# Patient Record
Sex: MALE | Race: BLACK OR AFRICAN AMERICAN | NOT HISPANIC OR LATINO | Employment: UNEMPLOYED | URBAN - METROPOLITAN AREA
[De-identification: names, ages, dates, MRNs, and addresses within clinical notes are randomized per-mention and may not be internally consistent; named-entity substitution may affect disease eponyms.]

---

## 2023-04-13 ENCOUNTER — HOSPITAL ENCOUNTER (OUTPATIENT)
Facility: HOSPITAL | Age: 23
Discharge: HOME | End: 2023-04-13
Attending: EMERGENCY MEDICINE | Admitting: EMERGENCY MEDICINE
Payer: MEDICAID

## 2023-04-13 VITALS
SYSTOLIC BLOOD PRESSURE: 136 MMHG | WEIGHT: 180 LBS | DIASTOLIC BLOOD PRESSURE: 84 MMHG | HEART RATE: 87 BPM | BODY MASS INDEX: 28.93 KG/M2 | HEIGHT: 66 IN | RESPIRATION RATE: 16 BRPM | OXYGEN SATURATION: 99 % | TEMPERATURE: 98.9 F

## 2023-04-13 DIAGNOSIS — N34.2 URETHRITIS: Primary | ICD-10-CM

## 2023-04-13 LAB
BILIRUB UR QL STRIP: NEGATIVE
C TRACH RRNA CVX QL NAA+PROBE: DETECTED
CLARITY UR: CLEAR
COLOR UR: YELLOW
GLUCOSE UR STRIP-MCNC: NEGATIVE MG/DL
HGB UR QL STRIP.AUTO: ABNORMAL
KETONES UR QL STRIP: NEGATIVE
LEUKOCYTE ESTERASE UR QL STRIP.AUTO: NEGATIVE
N GONORRHOEA RRNA SPEC QL NAA+PROBE: NOT DETECTED
NITRITE UR QL STRIP: NEGATIVE
PH UR STRIP.AUTO: 5.5 [PH] (ref 5–8)
PROT UR QL STRIP: ABNORMAL
SP GR UR STRIP: >=1.03 (ref 1–1.03)
UROBILINOGEN UR QL STRIP: ABNORMAL

## 2023-04-13 PROCEDURE — 96372 THER/PROPH/DIAG INJ SC/IM: CPT

## 2023-04-13 PROCEDURE — G0463 HOSPITAL OUTPT CLINIC VISIT: HCPCS | Performed by: EMERGENCY MEDICINE

## 2023-04-13 PROCEDURE — 25010000002 CEFTRIAXONE PER 250 MG: Performed by: EMERGENCY MEDICINE

## 2023-04-13 PROCEDURE — 81003 URINALYSIS AUTO W/O SCOPE: CPT | Performed by: EMERGENCY MEDICINE

## 2023-04-13 PROCEDURE — 87491 CHLMYD TRACH DNA AMP PROBE: CPT | Performed by: EMERGENCY MEDICINE

## 2023-04-13 PROCEDURE — 87591 N.GONORRHOEAE DNA AMP PROB: CPT | Performed by: EMERGENCY MEDICINE

## 2023-04-13 RX ORDER — DOXYCYCLINE 100 MG/1
100 CAPSULE ORAL 2 TIMES DAILY
Qty: 20 CAPSULE | Refills: 0 | Status: SHIPPED | OUTPATIENT
Start: 2023-04-13 | End: 2023-04-23

## 2023-04-13 RX ORDER — DOXYCYCLINE 100 MG/1
100 TABLET ORAL ONCE
Status: COMPLETED | OUTPATIENT
Start: 2023-04-13 | End: 2023-04-13

## 2023-04-13 RX ADMIN — DOXYCYCLINE 100 MG: 100 TABLET ORAL at 21:03

## 2023-04-13 RX ADMIN — LIDOCAINE HYDROCHLORIDE 1000 MG: 10 INJECTION, SOLUTION EPIDURAL; INFILTRATION; INTRACAUDAL; PERINEURAL at 21:03

## 2023-04-13 NOTE — Clinical Note
[facility] [place] [address] [place] [address] [place] [address]  Phone: [phone]    Isa Sanders was seen and treated in our emergency department on 4/13/2023.  He may return to work on 04/15/2023.         Thank you for choosing [facility].    Teofilo Sena MD

## 2023-04-13 NOTE — ED NOTES
Pt present s to the ED with complaints of exposure to STD, painful urination, and discharge to the penis.

## 2023-04-14 NOTE — FSED PROVIDER NOTE
Subjective   History of Present Illness  Patient 22-year-old man who presents complaining of urethral discharge with dysuria.  Symptoms all began several days ago and gradually worsening.  He denies any testicular pain or back pain or abdominal pain or fevers or nausea or vomiting.  Patient states he is sexually active and has had unprotected sex.  Patient denies any penile lesions or redness or swelling.        Review of Systems    History reviewed. No pertinent past medical history.    No Known Allergies    History reviewed. No pertinent surgical history.    History reviewed. No pertinent family history.    Social History     Socioeconomic History   • Marital status: Single           Objective   Physical Exam  Vitals and nursing note reviewed.   Constitutional:       General: He is not in acute distress.     Appearance: Normal appearance. He is not toxic-appearing.   HENT:      Head: Normocephalic and atraumatic.      Nose: Nose normal.   Eyes:      Extraocular Movements: Extraocular movements intact.   Pulmonary:      Effort: Pulmonary effort is normal.   Musculoskeletal:         General: Normal range of motion.      Cervical back: Normal range of motion.   Skin:     General: Skin is warm and dry.      Capillary Refill: Capillary refill takes less than 2 seconds.   Neurological:      General: No focal deficit present.      Mental Status: He is alert.         Procedures           ED Course                                           MDM   Patient with several days of urethral discharge with discomfort with voiding.  Symptoms all began approximately 2 to 3 days ago gradually worsening.  Patient has no fevers or nausea or vomiting or abdominal pain or back pain or testicular pain or penile lesions or swelling.  The patient had a urinalysis which showed moderate blood.  A gonorrhea and Chlamydia urine test is pending and the patient will be treated for gonorrhea and chlamydia with IM Rocephin and doxycycline 100 mg  twice a day for 10 days.  Patient has been advised to follow-up on test results and to also notify sexual partner.  Patient will return if any concerns.    Final diagnoses:   Urethritis       ED Disposition  ED Disposition     ED Disposition   Discharge    Condition   Stable    Comment   --             PATIENT CONNECTION - [facility]  [facility] [address]  [phone]  Call in 1 week      [facility] [place] FS[facility]  [address] [facility] [address] [phone]    If symptoms worsen         Medication List      New Prescriptions    doxycycline 100 MG capsule  Commonly known as: MONODOX  Take 1 capsule by mouth 2 (Two) Times a Day for 10 days.           Where to Get Your Medications      These medications were sent to [facility]/pharmacy #[id number] - [place], [place] - [address] [place] [address] - [phone]  - [phone] FX  [address] [place] [address] [place] [address]    Hours: 24-hours Phone: [phone]   · doxycycline 100 MG capsule

## 2024-01-17 ENCOUNTER — HOSPITAL ENCOUNTER (EMERGENCY)
Facility: HOSPITAL | Age: 24
Discharge: HOME | End: 2024-01-17
Attending: EMERGENCY MEDICINE | Admitting: EMERGENCY MEDICINE
Payer: MEDICAID

## 2024-01-17 VITALS
HEART RATE: 120 BPM | OXYGEN SATURATION: 95 % | DIASTOLIC BLOOD PRESSURE: 83 MMHG | RESPIRATION RATE: 20 BRPM | TEMPERATURE: 99.2 F | SYSTOLIC BLOOD PRESSURE: 139 MMHG

## 2024-01-17 DIAGNOSIS — J10.1 INFLUENZA B: Primary | ICD-10-CM

## 2024-01-17 LAB
B PARAPERT DNA SPEC QL NAA+PROBE: NOT DETECTED
B PERT DNA SPEC QL NAA+PROBE: NOT DETECTED
C PNEUM DNA NPH QL NAA+NON-PROBE: NOT DETECTED
FLUAV SUBTYP SPEC NAA+PROBE: NOT DETECTED
FLUBV RNA ISLT QL NAA+PROBE: DETECTED
HADV DNA SPEC NAA+PROBE: NOT DETECTED
HCOV 229E RNA SPEC QL NAA+PROBE: NOT DETECTED
HCOV HKU1 RNA SPEC QL NAA+PROBE: NOT DETECTED
HCOV NL63 RNA SPEC QL NAA+PROBE: NOT DETECTED
HCOV OC43 RNA SPEC QL NAA+PROBE: NOT DETECTED
HMPV RNA NPH QL NAA+NON-PROBE: NOT DETECTED
HPIV1 RNA ISLT QL NAA+PROBE: NOT DETECTED
HPIV2 RNA SPEC QL NAA+PROBE: NOT DETECTED
HPIV3 RNA NPH QL NAA+PROBE: NOT DETECTED
HPIV4 P GENE NPH QL NAA+PROBE: NOT DETECTED
M PNEUMO IGG SER IA-ACNC: NOT DETECTED
RHINOVIRUS RNA SPEC NAA+PROBE: NOT DETECTED
RSV RNA NPH QL NAA+NON-PROBE: NOT DETECTED
S PYO AG THROAT QL: NEGATIVE
SARS-COV-2 RNA NPH QL NAA+NON-PROBE: NOT DETECTED

## 2024-01-17 PROCEDURE — 87880 STREP A ASSAY W/OPTIC: CPT | Performed by: PHYSICIAN ASSISTANT

## 2024-01-17 PROCEDURE — 87081 CULTURE SCREEN ONLY: CPT | Performed by: PHYSICIAN ASSISTANT

## 2024-01-17 PROCEDURE — 0202U NFCT DS 22 TRGT SARS-COV-2: CPT | Performed by: EMERGENCY MEDICINE

## 2024-01-17 PROCEDURE — 99283 EMERGENCY DEPT VISIT LOW MDM: CPT

## 2024-01-17 RX ORDER — OSELTAMIVIR PHOSPHATE 75 MG/1
75 CAPSULE ORAL ONCE
Status: COMPLETED | OUTPATIENT
Start: 2024-01-17 | End: 2024-01-17

## 2024-01-17 RX ORDER — OSELTAMIVIR PHOSPHATE 75 MG/1
75 CAPSULE ORAL 2 TIMES DAILY
Qty: 10 CAPSULE | Refills: 0 | Status: SHIPPED | OUTPATIENT
Start: 2024-01-17 | End: 2024-01-18 | Stop reason: SDUPTHER

## 2024-01-17 RX ORDER — ACETAMINOPHEN 500 MG
1000 TABLET ORAL ONCE
Status: COMPLETED | OUTPATIENT
Start: 2024-01-17 | End: 2024-01-17

## 2024-01-17 RX ORDER — DEXTROMETHORPHAN HYDROBROMIDE AND PROMETHAZINE HYDROCHLORIDE 15; 6.25 MG/5ML; MG/5ML
5 SYRUP ORAL 4 TIMES DAILY PRN
Qty: 180 ML | Refills: 0 | Status: SHIPPED | OUTPATIENT
Start: 2024-01-17 | End: 2024-01-18 | Stop reason: SDUPTHER

## 2024-01-17 RX ADMIN — ACETAMINOPHEN 1000 MG: 500 TABLET ORAL at 14:56

## 2024-01-17 RX ADMIN — OSELTAMIVIR PHOSPHATE 75 MG: 75 CAPSULE ORAL at 16:37

## 2024-01-17 NOTE — ED PROVIDER NOTES
EMERGENCY DEPARTMENT ENCOUNTER    Room Number:  T03/03  PCP: Provider, No Known  Discussed/ obtained information from independent historians: patient      HPI:  Chief Complaint: sore throat  A complete HPI/ROS/PMH/PSH/SH/FH are unobtainable due to: none  Context: Isa Sanders is a 23 y.o. male who presents to the ED c/o sore throat. Patient states this started around 2 days ago but has been experiencing congestion for several days.  Patient states he is experienced rhinorrhea and has also had a cough in which he has coughed up green mucus.  Patient says he has mild discomfort when swallowing and has been running a fever.  Patient states he has been feeling sleepy. He denies any vomiting, diarrhea, ear pain, or other systemic symptoms.      External (non-ED) record review:   Reviewed note from urgent care visit where patient seen for left flank pain and bright red blood per rectum.  Point-of-care urine dipstick checked.  Patient referred to emergency department for any worsening symptoms.  Reviewed prior laboratory studies.  Most recent point-of-care urine dipstick negative for blood.  Most recent chlamydia PCR positive.      PAST MEDICAL HISTORY  Active Ambulatory Problems     Diagnosis Date Noted    No Active Ambulatory Problems     Resolved Ambulatory Problems     Diagnosis Date Noted    No Resolved Ambulatory Problems     No Additional Past Medical History         PAST SURGICAL HISTORY  No past surgical history on file.      FAMILY HISTORY  No family history on file.      SOCIAL HISTORY  Social History     Socioeconomic History    Marital status: Single         ALLERGIES  Patient has no known allergies.        REVIEW OF SYSTEMS  Review of Systems   Constitutional:  Positive for fatigue and fever. Negative for chills.   HENT:  Positive for congestion, rhinorrhea and sore throat.    Respiratory:  Negative for cough and shortness of breath.    Cardiovascular:  Negative for chest pain and palpitations.    Gastrointestinal:  Negative for abdominal pain and vomiting.   Genitourinary:  Negative for dysuria and hematuria.   Musculoskeletal:  Negative for arthralgias and joint swelling.   Skin:  Negative for pallor and rash.   Neurological:  Negative for syncope and headaches.   Psychiatric/Behavioral:  Negative for confusion and hallucinations.          PHYSICAL EXAM  ED Triage Vitals   Temp Heart Rate Resp BP SpO2   01/17/24 1425 01/17/24 1425 01/17/24 1425 01/17/24 1445 01/17/24 1425   (!) 101.9 °F (38.8 °C) 120 20 139/83 95 %      Temp src Heart Rate Source Patient Position BP Location FiO2 (%)   01/17/24 1425 01/17/24 1425 -- -- --   Tympanic Monitor          Physical Exam  Constitutional:       General: He is not in acute distress.     Appearance: Normal appearance.   HENT:      Head: Normocephalic and atraumatic.      Nose: Nose normal. Congestion present.      Mouth/Throat:      Mouth: Mucous membranes are moist.   Eyes:      Conjunctiva/sclera: Conjunctivae normal.      Pupils: Pupils are equal, round, and reactive to light.   Cardiovascular:      Rate and Rhythm: Normal rate and regular rhythm.      Pulses: Normal pulses.      Heart sounds: Normal heart sounds.   Pulmonary:      Effort: Pulmonary effort is normal.      Breath sounds: Normal breath sounds.   Abdominal:      General: There is no distension.   Musculoskeletal:         General: Normal range of motion.      Cervical back: Normal range of motion and neck supple.   Skin:     General: Skin is warm.      Capillary Refill: Capillary refill takes less than 2 seconds.   Neurological:      General: No focal deficit present.      Mental Status: He is alert and oriented to person, place, and time.   Psychiatric:         Mood and Affect: Mood normal.         Vital signs and nursing notes reviewed.          LAB RESULTS  Recent Results (from the past 24 hour(s))   Respiratory Panel PCR w/COVID-19(SARS-CoV-2) MARIBELL/TAMMI/YUE/PAD/COR/JERROD In-House, NP Swab in UTM/VTM,  2 HR TAT - Swab, Nasopharynx    Collection Time: 01/17/24  2:56 PM    Specimen: Nasopharynx; Swab   Result Value Ref Range    ADENOVIRUS, PCR Not Detected Not Detected    Coronavirus 229E Not Detected Not Detected    Coronavirus HKU1 Not Detected Not Detected    Coronavirus NL63 Not Detected Not Detected    Coronavirus OC43 Not Detected Not Detected    COVID19 Not Detected Not Detected - Ref. Range    Human Metapneumovirus Not Detected Not Detected    Human Rhinovirus/Enterovirus Not Detected Not Detected    Influenza A PCR Not Detected Not Detected    Influenza B PCR Detected (A) Not Detected    Parainfluenza Virus 1 Not Detected Not Detected    Parainfluenza Virus 2 Not Detected Not Detected    Parainfluenza Virus 3 Not Detected Not Detected    Parainfluenza Virus 4 Not Detected Not Detected    RSV, PCR Not Detected Not Detected    Bordetella pertussis pcr Not Detected Not Detected    Bordetella parapertussis PCR Not Detected Not Detected    Chlamydophila pneumoniae PCR Not Detected Not Detected    Mycoplasma pneumo by PCR Not Detected Not Detected   Rapid Strep A Screen - Swab, Throat    Collection Time: 01/17/24  3:24 PM    Specimen: Throat; Swab   Result Value Ref Range    Strep A Ag Negative Negative       Ordered the above labs and reviewed the results.            MEDICATIONS GIVEN IN ER  Medications   acetaminophen (TYLENOL) tablet 1,000 mg (1,000 mg Oral Given 1/17/24 1456)   oseltamivir (TAMIFLU) capsule 75 mg (75 mg Oral Given 1/17/24 1637)                   MEDICAL DECISION MAKING, PROGRESS, and CONSULTS    All labs have been independently reviewed by me.  All radiology studies have been reviewed by me and I have also reviewed the radiology report.   EKG's independently viewed and interpreted by me.  Discussion below represents my analysis of pertinent findings related to patient's condition, differential diagnosis, treatment plan and final disposition.            Orders placed during this visit:  Orders  Placed This Encounter   Procedures    Respiratory Panel PCR w/COVID-19(SARS-CoV-2) MARIBELL/TAMMI/YUE/PAD/COR/JERROD In-House, NP Swab in UTM/VTM, 2 HR TAT - Swab, Nasopharynx    Rapid Strep A Screen - Swab, Throat    Beta Strep Culture, Throat - Swab, Throat           Differential diagnosis:  Streptococcal pharyngitis, viral pharyngitis, viral URI      Independent interpretation of labs, radiology studies, and discussions with consultants:  ED Course as of 01/17/24 1808 Wed Jan 17, 2024   1554 Strep A Ag: Negative [MP]   1627 Influenza B PCR(!): Detected [MP]   1808 Patient presents emergency department with cough, congestion, sore throat.  Worked up today with strep swab and RPP.  He is positive for influenza B today.  Patient treated with Tylenol and initiated on Tamiflu here in the ED.  Encouraged him to follow-up with PCP and discussed ED return precautions.  He is otherwise well-appearing, hemodynamically stable, and therefore appropriate for discharge. [MP]      ED Course User Index  [MP] Nat Kothari PA-C           Additional orders considered but not ordered:  Chest x-ray      Additional sources:    - Chronic or social conditions impacting care: N/A          DIAGNOSIS  Final diagnoses:   Influenza B           Follow Up:  PATIENT CONNECTION - [facility] [address]  [phone]  Schedule an appointment as soon as possible for a visit in 2 days  For follow-up of your complaints      RX:     Medication List        New Prescriptions      oseltamivir 75 MG capsule  Commonly known as: TAMIFLU  Take 1 capsule by mouth 2 (Two) Times a Day for 5 days.     promethazine-dextromethorphan 6.25-15 MG/5ML syrup  Commonly known as: PROMETHAZINE-DM  Take 5 mL by mouth 4 (Four) Times a Day As Needed for Cough.               Where to Get Your Medications        These medications were sent to [facility]/pharmacy #[id number] - [place], [place] - [address] - [phone]  - [phone] FX  [address] [facility],  [facility] [address]      Phone: [phone]   oseltamivir 75 MG capsule  promethazine-dextromethorphan 6.25-15 MG/5ML syrup         Latest Documented Vital Signs:  As of 18:08 EST  BP- 139/83 HR- 120 Temp- 99.2 °F (37.3 °C) (Tympanic) O2 sat- 95%              --    Please note that portions of this were completed with a voice recognition program.       Note Disclaimer: At [facility], we believe that sharing information builds trust and better relationships. You are receiving this note because you are receiving care at [facility] or recently visited. It is possible you will see health information before a provider has talked with you about it. This kind of information can be easy to misunderstand. To help you fully understand what it means for your health, we urge you to discuss this note with your provider.             Nat Kothari PA-C  01/17/24 4489

## 2024-01-17 NOTE — DISCHARGE INSTRUCTIONS
Follow-up with PCP.  Call patient connection number to establish with a primary care provider.  Complete entire course of Tamiflu.  Use Phenergan DM syrup as prescribed to help with cough.  Return to ED for any worsening symptoms.

## 2024-01-18 RX ORDER — DEXTROMETHORPHAN HYDROBROMIDE AND PROMETHAZINE HYDROCHLORIDE 15; 6.25 MG/5ML; MG/5ML
5 SYRUP ORAL 4 TIMES DAILY PRN
Qty: 180 ML | Refills: 0 | Status: SHIPPED | OUTPATIENT
Start: 2024-01-18

## 2024-01-18 RX ORDER — OSELTAMIVIR PHOSPHATE 75 MG/1
75 CAPSULE ORAL 2 TIMES DAILY
Qty: 10 CAPSULE | Refills: 0 | Status: SHIPPED | OUTPATIENT
Start: 2024-01-18 | End: 2024-01-23

## 2024-01-18 NOTE — ED PROVIDER NOTES
SHARED VISIT: This visit was performed by BOTH a physician and an APC. The substantive portion of the medical decision making was performed by this attesting physician who made or approved the management plan and takes responsibility for patient management. All studies in the APC note (if performed) were independently interpreted by me.      The GONZALES and I have discussed this patient's history, physical exam, and treatment plan.  I have reviewed the documentation and personally had a face to face interaction with the patient. I affirm the documentation and agree with the treatment and plan.  The attached note describes my personal findings.       I provided a substantive portion of the care of the patient.  I personally performed the physical exam in its entirety, and below are my findings.      Brief history of present illness: This patient presents for evaluation of sore throat and congestion with some chills and generalized malaise for the past 2 days.  He has had a mild cough but denies any shortness of breath.  She denies any vomiting or diarrhea.  He also describes feeling very fatigued and sleeping more than normal.  There have been no known sick contacts.      PHYSICAL EXAM  ED Triage Vitals   Temp Heart Rate Resp BP SpO2   01/17/24 1425 01/17/24 1425 01/17/24 1425 01/17/24 1445 01/17/24 1425   (!) 101.9 °F (38.8 °C) 120 20 139/83 95 %      Temp src Heart Rate Source Patient Position BP Location FiO2 (%)   01/17/24 1425 01/17/24 1425 -- -- --   Tympanic Monitor            GENERAL: Nontoxic-appearing, no acute distress  HENT: nares patent, normocephalic, atraumatic, moist mucous membranes.  No drooling or trismus.  Posterior pharynx appears slightly inflamed with mild erythema but no exudative changes or edema.  EYES: no scleral icterus, normal conjunctivae  CV: Normal pulses, normal rate  RESPIRATORY: normal effort, no stridor  MUSCULOSKELETAL: no deformity  NEURO: alert, moves all extremities, follows  "commands  PSYCH:  calm, cooperative  SKIN: warm, dry    Vital signs and nursing notes reviewed.        My differential diagnosis includes but is not limited to: COVID-19, strep pharyngitis, influenza, URI    ED Course as of 01/17/24 2208 Wed Jan 17, 2024   1554 Strep A Ag: Negative [MP]   1627 Influenza B PCR(!): Detected [MP]   1808 Patient presents emergency department with cough, congestion, sore throat.  Worked up today with strep swab and RPP.  He is positive for influenza B today.  Patient treated with Tylenol and initiated on Tamiflu here in the ED.  Encouraged him to follow-up with PCP and discussed ED return precautions.  He is otherwise well-appearing, hemodynamically stable, and therefore appropriate for discharge. [MP]      ED Course User Index  [MP] Nat Kothari PA-C         MDM / Plan: Strep swab was ordered along with RVP.  Patient was febrile at triage here.  We gave him appropriate dose of Tylenol to reduce his fever.    He is nontoxic-appearing with normal work of breathing and normal saturations on room air.  There is no physical exam features to suggest a peritonsillar abscess or any other ENT emergency condition right now.    RVP is positive for influenza B.  This explains his clinical presentation perfectly well.  Since his symptoms just recently began, there may be some benefit for him to to start the Tamiflu medication regimen.  We will give him a first dose here in the department.  I think he is safe to discharge home for continued symptomatic management.  Will discuss with him the usual \"return to ER \"structures prior to discharge.      Please note that portions of this were completed with a voice recognition program.         Note Disclaimer: At [facility], we believe that sharing information builds trust and better relationships. You are receiving this note because you are receiving care at [facility] or recently visited. It is possible you will see health information " before a provider has talked with you about it. This kind of information can be easy to misunderstand. To help you fully understand what it means for your health, we urge you to discuss this note with your provider.     Rory López MD  01/17/24 1162

## 2024-01-19 LAB — BACTERIA SPEC AEROBE CULT: NORMAL

## 2025-03-13 ENCOUNTER — TELEPHONE (OUTPATIENT)
Dept: URGENT CARE | Facility: CLINIC | Age: 25
End: 2025-03-13

## 2025-03-13 DIAGNOSIS — A74.9 CHLAMYDIA: Primary | ICD-10-CM

## 2025-03-13 RX ORDER — DOXYCYCLINE 100 MG/1
CAPSULE ORAL
Qty: 14 CAPSULE | Refills: 0 | Status: SHIPPED | OUTPATIENT
Start: 2025-03-13